# Patient Record
Sex: FEMALE | Race: WHITE
[De-identification: names, ages, dates, MRNs, and addresses within clinical notes are randomized per-mention and may not be internally consistent; named-entity substitution may affect disease eponyms.]

---

## 2017-03-06 ENCOUNTER — HOSPITAL ENCOUNTER (OUTPATIENT)
Dept: HOSPITAL 47 - RADMAMWWP | Age: 57
Discharge: HOME | End: 2017-03-06
Payer: COMMERCIAL

## 2017-03-06 DIAGNOSIS — Z12.31: Primary | ICD-10-CM

## 2017-03-06 PROCEDURE — 77063 BREAST TOMOSYNTHESIS BI: CPT

## 2017-03-08 NOTE — MM
Reason for exam: screening  (asymptomatic).

Last mammogram was performed 1 year ago.



History:

Patient is postmenopausal and history of other cancer.

Family history of breast cancer in paternal aunt at age 60, breast cancer in 

paternal cousin, and breast cancer in maternal aunt at age 80.

Excisional biopsy of the left breast, 1980.



Physical Findings:

A clinical breast exam by your physician is recommended on an annual basis and 

results should be correlated with mammographic findings.



MG 3D Screening Mammo W/Cad

Bilateral CC and MLO view(s) were taken.

Prior study comparison: February 25, 2016, bilateral MG 3d screening mammo w/cad. 

February 24, 2015, bilateral MG screening mammo w CAD.

The breast tissue is heterogeneously dense. This may lower the sensitivity of 

mammography.  No significant changes when compared with prior studies.





ASSESSMENT: Benign, BI-RAD 2



RECOMMENDATION:

Routine screening mammogram of both breasts in 1 year.

## 2018-03-19 ENCOUNTER — HOSPITAL ENCOUNTER (OUTPATIENT)
Dept: HOSPITAL 47 - RADMAMWWP | Age: 58
Discharge: HOME | End: 2018-03-19
Payer: COMMERCIAL

## 2018-03-19 DIAGNOSIS — Z12.31: Primary | ICD-10-CM

## 2018-03-19 DIAGNOSIS — M85.80: ICD-10-CM

## 2018-03-19 PROCEDURE — 77067 SCR MAMMO BI INCL CAD: CPT

## 2018-03-19 PROCEDURE — 77080 DXA BONE DENSITY AXIAL: CPT

## 2018-03-19 PROCEDURE — 77063 BREAST TOMOSYNTHESIS BI: CPT

## 2018-03-20 NOTE — BD
EXAMINATION TYPE: MG DEXA axial skeleton.  

 

DATE OF EXAM: 3/19/2018

 

COMPARISON: 2015

 

CLINICAL HISTORY: Osteoporosis screening. Postmenopausal female.

 

Height:  5'6 1/2

Weight:  146

 

FRAX RISK QUESTIONS:

Alcohol (3 or more units per day):  no

Family History (Parent hip fracture):  no

Glucocorticoids (More than 3mos):  no

           (Ex: prednisone, prednisolone, methylprednisolone, dexamethasone, and hydrocortisone).    
     

History of Fracture in Adulthood: no

Secondary Osteoporosis:

  1.  Type 1 Diabetes: no

  2.  Hyperthyroidism: no

  3.  Menopause before 45: no

  4.  Malnutrition: no

  5.  Chronic liver disease: no

Rheumatoid Arthritis: no

Current Tobacco Use: no

 

RISK FACTORS 

HISTORY OF: 

Postmenopausal woman:

 

 

MEDICATIONS: 

Additional Medications:

 

 

Additional History: skin cancer 2012

 

 

EXAM MEASUREMENTS: 

Bone mineral densitometry was performed using the [employer] System.

Bone mineral density as measured about the Lumbar spine is:  

----- L1-L4(G/cm2): 1.025

T Score Values are as follows:

----- L2: -1.7

----- L3: -0.9

----- L4: -1.6

----- L1-L4: -1.3

Bone mineral density has: Increased 0.2% since study of: 02/26/2015

 

Bone mineral density about the R hip (g/cm2): 0.860

Bone mineral density about the L hip (g/cm2): 0.873

T Score values are as follows:

-----R Neck: -1.3

-----L Neck: -1.2

-----R Total: -1.3

-----L Total: -1.3

Bone mineral density has: Decreased -3.3% since study of: 02/26/3015

 

 

IMPRESSION:

Osteopenia (T Score between -2.5 and -1).

 

There is slightly increased risk of fracture and the patient may be considered 

for treatment. 

 

Re-Screen 2-5 years.

 

 

 

 

 

NOTE:  T-SCORE=SD OF THE YOUNG ADULT MEAN.

## 2018-03-20 NOTE — MM
Reason for exam: screening  (asymptomatic).

Last mammogram was performed 1 year ago.



History:

Patient is postmenopausal and history of other cancer.

Family history of breast cancer in paternal aunt at age 60, breast cancer in 

paternal cousin, and breast cancer in maternal aunt at age 80.

Excisional biopsy of the left breast, 1980.



Physical Findings:

A clinical breast exam by your physician is recommended on an annual basis and 

results should be correlated with mammographic findings.



MG 3D Screening Mammo W/Cad

Bilateral CC and MLO view(s) were taken.

Prior study comparison: March 6, 2017, bilateral MG 3d screening mammo w/cad.  

February 25, 2016, bilateral MG 3d screening mammo w/cad.

The breast tissue is heterogeneously dense. This may lower the sensitivity of 

mammography.  No suspicious abnormality.  No significant changes when compared 

with prior studies.





ASSESSMENT: Negative, BI-RAD 1



RECOMMENDATION:

Routine screening mammogram of both breasts in 1 year.

## 2020-07-06 ENCOUNTER — HOSPITAL ENCOUNTER (OUTPATIENT)
Dept: HOSPITAL 47 - RADMAMWWP | Age: 60
Discharge: HOME | End: 2020-07-06
Attending: OBSTETRICS & GYNECOLOGY
Payer: COMMERCIAL

## 2020-07-06 DIAGNOSIS — M85.80: ICD-10-CM

## 2020-07-06 DIAGNOSIS — Z12.31: Primary | ICD-10-CM

## 2020-07-06 PROCEDURE — 77067 SCR MAMMO BI INCL CAD: CPT

## 2020-07-06 PROCEDURE — 77063 BREAST TOMOSYNTHESIS BI: CPT

## 2020-07-06 PROCEDURE — 77080 DXA BONE DENSITY AXIAL: CPT

## 2020-07-07 NOTE — BD
EXAMINATION TYPE: Axial Bone Density

 

DATE OF EXAM: 7/6/2020

 

COMPARISON: 03/19/2018

 

CLINICAL HISTORY:

 

Height:  65.7 IN

Weight:  150 LBS

 

 

RISK FACTORS 

HISTORY OF: 

Active: YES

Postmenopausal woman: AGE 55

 

MEDICATIONS: 

Additional Medications: CALCIUM, VIT D,  

 

 

 

EXAM MEASUREMENTS: 

Bone mineral densitometry was performed using the [employer] System.

Bone mineral density as measured about the Lumbar spine is:  

----- L1-L4(G/cm2): 0.978

T Score Values are as follows:

----- L2: -2.3

----- L3: -1.2

----- L4: -2.0

----- L1-L4: -1.7

Bone mineral density has: Decreased -5.1% since study of: 03/19/2018

 

Bone mineral density about the R hip (g/cm2): 0.863

Bone mineral density about the L hip (g/cm2): 0.843

T Score values are as follows:

-----R Neck: -1.3

-----L Neck: -1.4

-----R Total: -1.4

-----L Total: -1.5

Bone mineral density has: Decreased -2.5% since study of: 03/19/2018

 

 

 

IMPRESSION:

Osteopenia (T Score between -2.5 and -1).

 

There is slightly increased risk of fracture and the patient may be considered 

for treatment. 

 

Re-Screen 2-5 years.

 

 

 

 

 

NOTE:  T-SCORE=SD OF THE YOUNG ADULT MEAN.

## 2020-07-08 NOTE — MM
Reason for exam: screening  (asymptomatic).

Last mammogram was performed 1 year and 4 months ago.



History:

Patient is postmenopausal and history of other cancer.

Family history of breast cancer in paternal aunt at age 60, breast cancer in 

paternal cousin, and breast cancer in maternal aunt at age 80.

Excisional biopsy of the left breast, 1980.



Physical Findings:

A clinical breast exam by your physician is recommended on an annual basis and 

results should be correlated with mammographic findings.



MG 3D Screening Mammo W/Cad

Bilateral CC and MLO view(s) were taken.

Prior study comparison: March 21, 2019, bilateral MG 3d screening mammo w/cad.  

March 19, 2018, bilateral MG 3d screening mammo w/cad.  March 6, 2017, bilateral 

MG 3d screening mammo w/cad.

The breast tissue is heterogeneously dense. This may lower the sensitivity of 

mammography.  No significant changes when compared with prior studies.





ASSESSMENT: Negative, BI-RAD 1



RECOMMENDATION:

Routine screening mammogram of both breasts in 1 year.

## 2021-07-16 ENCOUNTER — HOSPITAL ENCOUNTER (OUTPATIENT)
Dept: HOSPITAL 47 - RADMAMWWP | Age: 61
Discharge: HOME | End: 2021-07-16
Attending: OBSTETRICS & GYNECOLOGY
Payer: COMMERCIAL

## 2021-07-16 DIAGNOSIS — Z12.31: Primary | ICD-10-CM

## 2021-07-16 DIAGNOSIS — Z85.9: ICD-10-CM

## 2021-07-16 DIAGNOSIS — Z78.0: ICD-10-CM

## 2021-07-16 DIAGNOSIS — Z80.3: ICD-10-CM

## 2021-07-16 PROCEDURE — 77063 BREAST TOMOSYNTHESIS BI: CPT

## 2021-07-16 PROCEDURE — 77067 SCR MAMMO BI INCL CAD: CPT

## 2021-07-19 NOTE — MM
Reason for exam: screening  (asymptomatic).

Last mammogram was performed 1 year ago.



History:

Patient is postmenopausal and history of other cancer.

Family history of breast cancer in paternal aunt at age 60, breast cancer in 

paternal cousin, and breast cancer in maternal aunt at age 80.

Excisional biopsy of the left breast, 1980.



Physical Findings:

A clinical breast exam by your physician is recommended on an annual basis and 

results should be correlated with mammographic findings.



MG 3D Screening Mammo W/Cad

Bilateral CC and MLO view(s) were taken.

Prior study comparison: July 6, 2020, bilateral MG 3d screening mammo w/cad.  

March 21, 2019, bilateral MG 3d screening mammo w/cad.

The breast tissue is heterogeneously dense. This may lower the sensitivity of 

mammography.





ASSESSMENT: Negative, BI-RAD 1



RECOMMENDATION:

Routine screening mammogram of both breasts in 1 year.

## 2022-07-27 ENCOUNTER — HOSPITAL ENCOUNTER (OUTPATIENT)
Dept: HOSPITAL 47 - RADBDWWP | Age: 62
Discharge: HOME | End: 2022-07-27
Attending: OBSTETRICS & GYNECOLOGY
Payer: COMMERCIAL

## 2022-07-27 DIAGNOSIS — Z78.0: ICD-10-CM

## 2022-07-27 DIAGNOSIS — Z80.3: ICD-10-CM

## 2022-07-27 DIAGNOSIS — Z12.31: Primary | ICD-10-CM

## 2022-07-27 PROCEDURE — 77063 BREAST TOMOSYNTHESIS BI: CPT

## 2022-07-27 PROCEDURE — 77080 DXA BONE DENSITY AXIAL: CPT

## 2022-07-27 PROCEDURE — 77067 SCR MAMMO BI INCL CAD: CPT

## 2022-07-28 NOTE — MM
Reason for Exam: Screening  (asymptomatic). 

Last screening mammogram was performed 12 month(s) ago.





Patient History: 

Menarche at age 16. First Full-Term Pregnancy at age 26. Postmenopausal. Other cancer. 1980,

Excisional Biopsy on the Left side.

Paternal cousin had breast cancer. Paternal aunt had breast cancer, age 60. Maternal aunt had breast

cancer, age 80. 





Risk Values: 

Radha 5 year model risk: 1.4%.

NCI Lifetime model risk: 7.1%.





Prior Study Comparison: 

3/21/2019 Bilateral Screening Mammogram, [facility]. 7/6/2020 Bilateral Screening Mammogram, [facility]. 7/16/2021

Bilateral Screening Mammogram, [facility]. 





Tissue Density: 

The breast tissue is heterogeneously dense. This may lower the sensitivity of mammography.





Findings: 

Analyzed By CAD. 

Areas of asymmetric density remain unchanged. No significant change from prior exams. 





Overall Assessment: Benign, BI-RAD 2





Management: 

Screening Mammogram of both breasts in 1 year.

1. Patient should continue monthly self breast exams.

2. A clinical breast exam by your physician is recommended on an annual basis.

3. This exam should not preclude additional follow-up of suspicious palpable abnormalities.



Electronically signed and approved by: Norm Pandya M.D. Radiologist

## 2022-08-01 NOTE — BD
EXAMINATION TYPE: Axial Bone Density

 

DATE OF EXAM: 7/27/2022

 

COMPARISON: 3-

 

CLINICAL HISTORY: 62 years year old Female.  ICD-10 CODE: ,Z78.0 menopausal,N95.1 menopausal

 

Height:  66IN

Weight:  156

 

FRAX RISK QUESTIONS:

       

 

Secondary Osteoporosis:

  

  

RISK FACTORS 

HISTORY OF: 

Active: YES

Postmenopausal woman: YES MENOPAUSE AT 54

 

MEDICATIONS: 

Additional Medications: CALCIUM OFF AND ON 

 

 

Additional History:

 

 

EXAM MEASUREMENTS: 

Bone mineral densitometry was performed using the [employer] System.

Bone mineral density as measured about the Lumbar spine is:  

----- L1-L4(G/cm2): 0.963

T Score Values are as follows:

----- L1: -1.8

----- L2: -2.2

----- L3: -1.5

----- L4: -2.0

----- L1-L4: -1.8

Bone mineral density has: Decreased -5.3% since study of: 3-

 

Bone mineral density about the R hip (g/cm2): 0.854

Bone mineral density about the L hip (g/cm2): 0.823

T Score values are as follows:

-----R Neck: -1.2

-----L Neck: -1.5

-----R Total: -1.2

-----L Total: -1.5

Bone mineral density has: Decreased -0.6% since study of: 3-

 

 

 

FRAX%s: The graph provided illustrates a 8.1% chance for a major osteoporotic fx and a 0.7% chance fo
r the hips probability for fx in 10 years time.

 

IMPRESSION:

Osteopenia (T Score between -2.5 and -1).

 

There is slightly increased risk of fracture and the patient may be considered 

for treatment. 

 

Re-Screen 2-5 years.

 

NOTE:  T-SCORE=SD OF THE YOUNG ADULT MEAN.

## 2023-07-28 ENCOUNTER — HOSPITAL ENCOUNTER (OUTPATIENT)
Dept: HOSPITAL 47 - RADMAMWWP | Age: 63
Discharge: HOME | End: 2023-07-28
Attending: OBSTETRICS & GYNECOLOGY
Payer: COMMERCIAL

## 2023-07-28 DIAGNOSIS — Z80.3: ICD-10-CM

## 2023-07-28 DIAGNOSIS — Z78.0: ICD-10-CM

## 2023-07-28 DIAGNOSIS — Z12.31: Primary | ICD-10-CM

## 2023-07-28 PROCEDURE — 77067 SCR MAMMO BI INCL CAD: CPT

## 2023-07-28 PROCEDURE — 77063 BREAST TOMOSYNTHESIS BI: CPT

## 2023-07-31 NOTE — MM
Reason for Exam: Screening  (asymptomatic). 

Last screening mammogram was performed 12 month(s) ago.





Patient History: 

Menarche at age 16. First Full-Term Pregnancy at age 26. Postmenopausal. Other cancer. 1980,

Excisional Biopsy on the Left side.

Paternal cousin had breast cancer. Paternal aunt had breast cancer, age 60. Maternal aunt had breast

cancer, age 80. 





Risk Values: 

Radha 5 year model risk: 1.4%.

NCI Lifetime model risk: 6.9%.





Prior Study Comparison: 

7/6/2020 Bilateral Screening Mammogram, [facility]. 7/16/2021 Bilateral Screening Mammogram, [facility]. 7/27/2022

Bilateral MG 3D screening mammo w/cad, [facility]. 





Tissue Density: 

The breast tissue is heterogeneously dense. This may lower the sensitivity of mammography.





Findings: 

Analyzed By CAD. 

There is no suspicious group of microcalcifications or new suspicious mass in either breast. 





Overall Assessment: Negative, BI-RAD 1





Management: 

Screening Mammogram of both breasts in 1 year.

[facility] will attempt to contact patient to return for supplemental views and

ultrasound if indicated.



Patient should continue monthly self-breast exams.  A clinical breast exam by your physician is

recommended on an annual basis.

This exam should not preclude additional follow-up of suspicious palpable abnormalities.



Note on Radha scores and lifetime risk:

1. A Radha score greater than 3% is considered moderate risk. If this is the case, consider

specialist referral to assess eligibility for a risk reducing agent.

2. If overall lifetime risk for the development of breast cancer is 20% or higher, the patient may

qualify for future screening with alternating mammogram and breast MRI.



Electronically signed and approved by: Gilberto Alejandra DO

## 2024-10-02 ENCOUNTER — HOSPITAL ENCOUNTER (OUTPATIENT)
Dept: HOSPITAL 47 - WWCWWP | Age: 64
End: 2024-10-02
Attending: OBSTETRICS & GYNECOLOGY
Payer: COMMERCIAL

## 2024-10-02 VITALS
TEMPERATURE: 98.3 F | DIASTOLIC BLOOD PRESSURE: 89 MMHG | SYSTOLIC BLOOD PRESSURE: 151 MMHG | HEART RATE: 67 BPM | RESPIRATION RATE: 16 BRPM

## 2024-10-02 DIAGNOSIS — Z80.3: ICD-10-CM

## 2024-10-02 DIAGNOSIS — Z78.0: ICD-10-CM

## 2024-10-02 DIAGNOSIS — Z88.2: ICD-10-CM

## 2024-10-02 DIAGNOSIS — R03.0: ICD-10-CM

## 2024-10-02 DIAGNOSIS — Z12.31: Primary | ICD-10-CM

## 2024-10-02 DIAGNOSIS — Z79.899: ICD-10-CM

## 2024-10-02 DIAGNOSIS — M81.0: ICD-10-CM

## 2024-10-02 DIAGNOSIS — Z88.1: ICD-10-CM

## 2024-10-02 DIAGNOSIS — M85.88: ICD-10-CM

## 2024-10-02 DIAGNOSIS — Z88.8: ICD-10-CM

## 2024-10-02 PROCEDURE — 77063 BREAST TOMOSYNTHESIS BI: CPT

## 2024-10-02 PROCEDURE — 77067 SCR MAMMO BI INCL CAD: CPT

## 2024-10-02 PROCEDURE — 77080 DXA BONE DENSITY AXIAL: CPT

## 2024-10-02 NOTE — BD
EXAMINATION TYPE: Axial Bone Density

 

DATE OF EXAM: 10/2/2024

 

CLINICAL HISTORY: 64 years old Female.  ICD-10 CODE: Z78.0 POST MENOPAUSAL WITHOUT HRT

 

Height:  66"

Weight:  161lbs

 

FRAX RISK QUESTIONS:

 

Alcohol (3 or more units per day):  No

Family History (Parent hip fracture):  No

Glucocorticoids (More than 3mos):  No

           (Ex: prednisone, prednisolone, methylprednisolone, dexamethasone, and hydrocortisone).    
     

History of Fracture in Adulthood: No

Secondary Osteoporosis:

  1.  Type 1 Diabetes: No

  2.  Hyperthyroidism: No

  3.  Menopause before 45: No

  4.  Malnutrition: No

  5.  Chronic liver disease: No

Rheumatoid Arthritis: No

Current Tobacco Use: No

 

RISK FACTORS 

 

HISTORY OF: 

Hip Fracture (Right/Left): No

Spine Fracture: No

History of Wrist Fracture: No

Surgery to Spine/Hip(right/left)/Wrist (right/left): No

 

 

MEDICATIONS: 

Thyroid Medications:  No

Osteoporosis Medications: Yes

Which medication:  Alendronate, once a week for two years

 

 

EXAM MEASUREMENTS: 

Bone mineral densitometry was performed using the [employer] System.

Bone mineral density as measured about the Lumbar spine is:  

----- L1-L4(G/cm2): 1.041

T Score Values are as follows:

----- L1: -1.4

----- L2: -1.5

----- L3: -0.7

----- L4: -1.2

----- L1-L4: -1.2

 

Z Score Values are as follows:

----- L1: -0.3

----- L2: -0.4

----- L3: 0.4

----- L4: -0.1

----- L1-L4: -0.1

 

Bone mineral density has: increased 8.1% since study of: 7/27/2022

 

Bone mineral density about the R hip (g/cm2): 0.894

Bone mineral density about the L hip (g/cm2): 0.881

T Score values are as follows:

-----R Neck: -0.96

-----L Neck: -0.9

-----R Total: -0.9

-----L Total: -1.0

 

Z Score values are as follows:

-----R Neck: 0.2

-----L Neck: 0.3

-----R Total: -0.1

-----L Total: -0.2

 

Bone mineral density has: increased 6.0% since study of: 7/27/2022

 

 

FRAX%s: The graph provided illustrates a 7.5% chance for a major osteoporotic fx and a 0.4% chance fo
r the hips probability for fx in 10 years time.

 

 

 

 

IMPRESSION:

Osteopenia (T Score between -2.5 and -1).

 

There is slightly increased risk of fracture and the patient may be considered 

for treatment. 

 

Re-Screen 2-5 years.

 

NOTE:  T-SCORE=SD OF THE YOUNG ADULT MEAN.

 

 

 

 

 

 

[facility], Workstation: [id number], 10/2/2024 11:54 AM

## 2024-10-02 NOTE — P.HPOB
History of Present Illness


H&P Date: 10/02/24


Chief Complaint: The patient is here for her routine gynecologic exam and ma

mmogram.





This is a 64-year-old  with an LMP of .  The patient is here to 

establish with this office.  It has been about a year since her last pelvic 

exam.  She previously saw Dr. Aguilar for her gynecologic care.  She is without 

gynecologic complaints and denies any postmenopausal bleeding.  She states she 

has been seen regularly for gynecologic care and Pap smears.  She has no history

of cervical problems.





Review of Systems





The patient has gained 6 pounds over the last year.  She denies respiratory, 

cardiac, or G.I. problems.





Past Medical History


Past Medical History: Cancer, GERD/Reflux


Additional Past Medical History / Comment(s): BASAL CELL REMOVED FROM NOSE AND 

SHOULDER, HEART MURMUR.  Intermittent neutropenia(followed by hematologist). 

PAST GYN HISTORY: She has no history of STDs.


History of Any Multi-Drug Resistant Organisms: None Reported


Past Surgical History: Breast Surgery


Additional Past Surgical History / Comment(s): BENIGN BREAST CYST REMOVED . 

Colonoscopy (next after 10yr).


Past Anesthesia/Blood Transfusion Reactions: No Reported Reaction


Past Psychological History: No Psychological Hx Reported


Smoking Status: Never smoker


Past Alcohol Use History: Occasional (2-3 drinks per week.)


Past Drug Use History: None Reported


Additional History: She has been  since .  She is a retired 

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





- Past Family History


  ** Father


Family Medical History: Cancer, Hypertension


Additional Family Medical History / Comment(s): Sinus cancer.  .  

Paternal cousin had breast cancer.





  ** Mother


Family Medical History: Cancer


Additional Family Medical History / Comment(s): Brain tumor.  .  

Maternal aunt had breast cancer.





  ** Brother(s)


Family Medical History: Liver Disease


Additional Family Medical History / Comment(s): Alcohol induced liver cirrhosis.

 .





Medications and Allergies


                                Home Medications











 Medication  Instructions  Recorded  Confirmed  Type


 


Alendronate Sodium [Fosamax] 70 mg PO WEEKLY 10/02/24 10/02/24 History


 


Calcium Carbonate [Calcium] 600 mg PO DAILY 10/02/24 10/02/24 History


 


Cholecalciferol (Vitamin D3) 1 cap PO DAILY 10/02/24 10/02/24 History





[Vitamin D3 (125 MCG = 5,000 IU)]    


 


Cyanocobalamin [Vitamin B-12] 1 tab PO DAILY 10/02/24 10/02/24 History


 


Famotidine [Pepcid] 10 mg PO DAILY 10/02/24 10/02/24 History


 


Multivitamin [Multivitamins Adult 1 tab PO DAILY 10/02/24 10/02/24 History





Gummies]    








                                    Allergies











Allergy/AdvReac Type Severity Reaction Status Date / Time


 


nitrofurantoin Allergy  Rapid Unverified 10/02/24 09:26





[From Macrobid]   Heart Rate  


 


prednisone Allergy  Rapid Unverified 10/02/24 09:26





   Heart Rate  


 


Sulfa (Sulfonamide Allergy  Rash/Hives Unverified 10/02/24 09:26





Antibiotics)     














Exam


                                   Vital Signs











  Temp Pulse Resp BP Pulse Ox


 


 10/02/24 09:28  98.3 F  67  16  151/89  99








                                Intake and Output











 10/01/24 10/02/24 10/02/24





 22:59 06:59 14:59


 


Other:   


 


  Weight   73.936 kg














Height 5 feet 7 inches, weight 163 pounds, BMI 25.5.





This is a well-developed well-nourished white female who is alert and oriented 

times 3 in no acute distress.


HEENT: Within normal limits.


NECK: Supple without mass or thyromegaly.


CHEST AND LUNGS: Clear to auscultation.


HEART: Regular rate and rhythm.


BREASTS: Are without mass or discharge.


AXILLARY EXAM: Negative for adenopathy.


BACK: Negative for CVA tenderness.


ABDOMEN: Soft, nontender, without palpable masses.


PELVIC EXAM: Normal external genitalia with minimal atrophy. Cervix and vagina 

appear normal with minimal atrophy. There is no unusual discharge.  There is no 

evidence of prolapse.  The uterus is midposition, nongravid size and nontender. 

There are no palpable adnexal masses or tenderness.


RECTAL EXAM: Rectovaginal exam is negative for mass or tenderness and is 

negative for occult blood.


EXTREMITIES: Nontender.





IMPRESSION: 


1.  64-year-old menopausal female with normal gynecologic exam.


2.  Elevated blood pressure.


3.  History of osteopenia and she has been on alendronate since .





PLAN: 


1.  Pap smear cotest was performed.  If negative, we will plan on repeating the 

Pap smear cotest in about 4 to 5 years and if that 1 is negative we will plan on

discontinuing Pap smears.


2.  Self breast awareness was discussed with the patient.  We have also 

discussed symptoms associated with inflammatory breast cancer.


3.  Screening mammogram will be done today.


4.  Osteoporosis prevention was discussed.  I have stressed the importance of 

adequate calcium, vitamin D and regular exercise.  Recommended amounts of 

calcium and vitamin D were also discussed.  Bone density test will be done 

today.  She will continue on alendronate at this time as prescribed by her PCP.


5.  We have discussed her elevated blood pressure.  She will check her own blood

pressure at home since she does have a blood pressure cuff.  This will be done 

on a regular basis and she will follow-up with her PCP for blood pressure 

elevations.


6.  She was advised to return in one year for her annual well woman exam.

## 2024-10-03 NOTE — MM
Reason for Exam: Screening  (asymptomatic). 

Last mammogram was performed 1 year(s) and 3 month(s) ago. 





Patient History: 

Menarche at age 16. First Full-Term Pregnancy at age 26. Postmenopausal. Other cancer. 1980,

Excisional Biopsy on the Left side.

Paternal cousin had breast cancer, age 40. Paternal aunt had breast cancer, age 60. Maternal aunt

had breast cancer, age 80. 





Risk Values: 

Radha 5 year model risk: 1.5%.

NCI Lifetime model risk: 6.7%.





Prior Study Comparison: 

7/16/2021 Bilateral Screening Mammogram, [facility]. 7/27/2022 Bilateral MG 3D screening mammo w/cad, [facility].

7/28/2023 Bilateral MG 3D screening mammo w/cad, [facility]. 





Tissue Density: 

There are scattered areas of fibroglandular density.





Findings: 

Analyzed By CAD. 

Right breast: There is no suspicious group of microcalcifications or new suspicious mass.



Left breast: There is no suspicious group of microcalcifications or new suspicious mass. 





Overall Assessment: Negative, BI-RAD 1





Management: 

Screening Mammogram of both breasts in 1 year.

[facility] will attempt to contact patient to return for supplemental views and

ultrasound if indicated.



Patient should continue monthly self-breast exams.  A clinical breast exam by your physician is

recommended on an annual basis.

This exam should not preclude additional follow-up of suspicious palpable abnormalities.



Note on Radha scores and lifetime risk:

1. A Radha score greater than 3% is considered moderate risk. If this is the case, consider

specialist referral to assess eligibility for a risk reducing agent.

2. If overall lifetime risk for the development of breast cancer is 20% or higher, the patient may

qualify for future screening with alternating mammogram and breast MRI.



[facility], Workstation: [id number], 10/3/2024 2:00 PM.



Electronically signed and approved by: Gilberto Alejandra DO